# Patient Record
Sex: FEMALE | Race: WHITE | NOT HISPANIC OR LATINO | Employment: OTHER | URBAN - METROPOLITAN AREA
[De-identification: names, ages, dates, MRNs, and addresses within clinical notes are randomized per-mention and may not be internally consistent; named-entity substitution may affect disease eponyms.]

---

## 2023-03-20 LAB
ALANINE AMINOTRANSFERASE (SGPT) (U/L) IN SER/PLAS: 12 U/L (ref 7–45)
ALBUMIN (G/DL) IN SER/PLAS: 3.9 G/DL (ref 3.4–5)
ALKALINE PHOSPHATASE (U/L) IN SER/PLAS: 67 U/L (ref 33–136)
ANION GAP IN SER/PLAS: 13 MMOL/L (ref 10–20)
ASPARTATE AMINOTRANSFERASE (SGOT) (U/L) IN SER/PLAS: 23 U/L (ref 9–39)
BASOPHILS (10*3/UL) IN BLOOD BY AUTOMATED COUNT: 0.03 X10E9/L (ref 0–0.1)
BASOPHILS/100 LEUKOCYTES IN BLOOD BY AUTOMATED COUNT: 0.6 % (ref 0–2)
BILIRUBIN TOTAL (MG/DL) IN SER/PLAS: 1.1 MG/DL (ref 0–1.2)
CALCIUM (MG/DL) IN SER/PLAS: 10.2 MG/DL (ref 8.6–10.3)
CARBON DIOXIDE, TOTAL (MMOL/L) IN SER/PLAS: 27 MMOL/L (ref 21–32)
CHLORIDE (MMOL/L) IN SER/PLAS: 106 MMOL/L (ref 98–107)
CHOLESTEROL (MG/DL) IN SER/PLAS: 86 MG/DL (ref 0–199)
CHOLESTEROL IN HDL (MG/DL) IN SER/PLAS: 38.1 MG/DL
CHOLESTEROL/HDL RATIO: 2.3
CREATININE (MG/DL) IN SER/PLAS: 1.22 MG/DL (ref 0.5–1.05)
EOSINOPHILS (10*3/UL) IN BLOOD BY AUTOMATED COUNT: 0.19 X10E9/L (ref 0–0.4)
EOSINOPHILS/100 LEUKOCYTES IN BLOOD BY AUTOMATED COUNT: 3.9 % (ref 0–6)
ERYTHROCYTE DISTRIBUTION WIDTH (RATIO) BY AUTOMATED COUNT: 18.2 % (ref 11.5–14.5)
ERYTHROCYTE MEAN CORPUSCULAR HEMOGLOBIN CONCENTRATION (G/DL) BY AUTOMATED: 27.4 G/DL (ref 32–36)
ERYTHROCYTE MEAN CORPUSCULAR VOLUME (FL) BY AUTOMATED COUNT: 87 FL (ref 80–100)
ERYTHROCYTES (10*6/UL) IN BLOOD BY AUTOMATED COUNT: 3.79 X10E12/L (ref 4–5.2)
GFR FEMALE: 42 ML/MIN/1.73M2
GLUCOSE (MG/DL) IN SER/PLAS: 97 MG/DL (ref 74–99)
HEMATOCRIT (%) IN BLOOD BY AUTOMATED COUNT: 32.9 % (ref 36–46)
HEMOGLOBIN (G/DL) IN BLOOD: 9 G/DL (ref 12–16)
IMMATURE GRANULOCYTES/100 LEUKOCYTES IN BLOOD BY AUTOMATED COUNT: 0.2 % (ref 0–0.9)
LDL: 32 MG/DL (ref 0–99)
LEUKOCYTES (10*3/UL) IN BLOOD BY AUTOMATED COUNT: 4.9 X10E9/L (ref 4.4–11.3)
LYMPHOCYTES (10*3/UL) IN BLOOD BY AUTOMATED COUNT: 1.28 X10E9/L (ref 0.8–3)
LYMPHOCYTES/100 LEUKOCYTES IN BLOOD BY AUTOMATED COUNT: 26 % (ref 13–44)
MONOCYTES (10*3/UL) IN BLOOD BY AUTOMATED COUNT: 0.6 X10E9/L (ref 0.05–0.8)
MONOCYTES/100 LEUKOCYTES IN BLOOD BY AUTOMATED COUNT: 12.2 % (ref 2–10)
NEUTROPHILS (10*3/UL) IN BLOOD BY AUTOMATED COUNT: 2.82 X10E9/L (ref 1.6–5.5)
NEUTROPHILS/100 LEUKOCYTES IN BLOOD BY AUTOMATED COUNT: 57.1 % (ref 40–80)
NRBC (PER 100 WBCS) BY AUTOMATED COUNT: 0 /100 WBC (ref 0–0)
PLATELETS (10*3/UL) IN BLOOD AUTOMATED COUNT: 172 X10E9/L (ref 150–450)
POTASSIUM (MMOL/L) IN SER/PLAS: 4.4 MMOL/L (ref 3.5–5.3)
PROTEIN TOTAL: 6.4 G/DL (ref 6.4–8.2)
SODIUM (MMOL/L) IN SER/PLAS: 142 MMOL/L (ref 136–145)
THYROTROPIN (MIU/L) IN SER/PLAS BY DETECTION LIMIT <= 0.05 MIU/L: 2.44 MIU/L (ref 0.44–3.98)
TRIGLYCERIDE (MG/DL) IN SER/PLAS: 78 MG/DL (ref 0–149)
UREA NITROGEN (MG/DL) IN SER/PLAS: 33 MG/DL (ref 6–23)
VLDL: 16 MG/DL (ref 0–40)

## 2023-10-04 ENCOUNTER — TREATMENT (OUTPATIENT)
Dept: PHYSICAL THERAPY | Facility: CLINIC | Age: 88
End: 2023-10-04
Payer: MEDICARE

## 2023-10-04 DIAGNOSIS — H81.12 BENIGN PAROXYSMAL POSITIONAL VERTIGO OF LEFT EAR: ICD-10-CM

## 2023-10-04 DIAGNOSIS — R42 DIZZINESS: Primary | ICD-10-CM

## 2023-10-04 PROCEDURE — 95992 CANALITH REPOSITIONING PROC: CPT | Mod: GP

## 2023-10-04 PROCEDURE — 97530 THERAPEUTIC ACTIVITIES: CPT | Mod: GP,59

## 2023-10-04 ASSESSMENT — PAIN SCALES - GENERAL: PAINLEVEL_OUTOF10: 10 - WORST POSSIBLE PAIN

## 2023-10-04 ASSESSMENT — PAIN - FUNCTIONAL ASSESSMENT: PAIN_FUNCTIONAL_ASSESSMENT: 0-10

## 2023-10-04 NOTE — PROGRESS NOTES
Physical Therapy    Physical Therapy Treatment    Patient Name: Keli Aparicio  MRN: [id number]  Today's Date: 10/4/2023  Time Calculation  Start Time: 1553  Stop Time: 1628  Time Calculation (min): 35 min    Insurance   Visit number: 8   Approved number of visits:  19 total  [facility]   Medicare Certification Period: Beginnin/ 10/ 2023 Ending: 10/ 08/ 2023   Medicare Re-Certification Period: Beginnin2023 Endin2023     Assessment:   Positive [place] Hallpike to the left with torsional nystagmus of short duration (<20 seconds) consistent with L posterior canalithiasis. Added vibration today to address non-resolving BPPV and will monitor response.  She was still challenged with static balance so did not update HEP.  Pt left with all questions answered, no increase in symptoms.    Plan:   Progress balance as tolerated.  Monitor response to BPPV.    Current Problem  1. Dizziness        2. Benign paroxysmal positional vertigo of left ear            Subjective   General   Pt denies any falls since last session. Reports compliance with balance HEP.  Son present during session.   Precautions  Precautions  Precautions Comment: HTN, leaky heart valve, Afib  osteopenic  Denies: CA, pacemaker, DM, HTN, or other known cardio/neuro/pulmonary problems.Fall risk: high       Pain  Pain Assessment: 0-10  Pain Score: 10 - Worst possible pain  Pain Location:  (left shoulder)  Pain Interventions:  (Pt agreeable to participate despite high pain levels.)    Objective     Treatments:  Semont for L posterior canalithiasis 5x - includes time between CRP  -with increased/decreased hold time 1x each   -2x with vibration x 30 sec prior to start  -1x with increased cervical AROM using body position as able    NBOS on land with EO x 30 sec - good challenge still    OP EDUCATION:    -Answered KELI's questions in full.  -reviewed relevant anatomy of BPPV using model  -Time spent on positional testing (multiple trials) and assessing  symptoms.  -KELI to avoid looking up/down during ADLs for a few hours after repositioning.  -KELI advised that she may feel off balance after the reposition and to use safety measures at home/device with walking or resting as needed to decrease fall risk, KELI verbalized understanding.     -KELI Educated on some strategies for management of non-resolving BPPV including:   -->use of vibration  -->option of holding each position longer vs going through the positions quicker in attempts to keep the otoconia flowing  -->reviewed recommendation of strict adherence with post repositioning guidelines and avoiding excessive head motions  -->vitamin D and rationale

## 2023-10-11 ENCOUNTER — TREATMENT (OUTPATIENT)
Dept: PHYSICAL THERAPY | Facility: CLINIC | Age: 88
End: 2023-10-11
Payer: MEDICARE

## 2023-10-11 DIAGNOSIS — H81.12 BENIGN PAROXYSMAL POSITIONAL VERTIGO OF LEFT EAR: Primary | ICD-10-CM

## 2023-10-11 DIAGNOSIS — R42 DIZZINESS: ICD-10-CM

## 2023-10-11 PROBLEM — M79.89 LEG SWELLING: Status: ACTIVE | Noted: 2017-06-01

## 2023-10-11 PROBLEM — I34.0 MITRAL REGURGITATION: Status: ACTIVE | Noted: 2023-10-11

## 2023-10-11 PROBLEM — I83.12 VARICOSE VEINS OF BOTH LOWER EXTREMITIES WITH INFLAMMATION: Status: ACTIVE | Noted: 2018-05-16

## 2023-10-11 PROBLEM — I83.11 VARICOSE VEINS OF BOTH LOWER EXTREMITIES WITH INFLAMMATION: Status: ACTIVE | Noted: 2018-05-16

## 2023-10-11 PROBLEM — M54.2 NECK PAIN: Status: ACTIVE | Noted: 2023-10-11

## 2023-10-11 PROBLEM — I48.21 PERMANENT ATRIAL FIBRILLATION (MULTI): Status: ACTIVE | Noted: 2023-10-11

## 2023-10-11 PROBLEM — E03.9 HYPOTHYROID: Status: ACTIVE | Noted: 2019-07-03

## 2023-10-11 PROBLEM — I10 ESSENTIAL HYPERTENSION: Status: ACTIVE | Noted: 2017-06-01

## 2023-10-11 PROBLEM — R35.0 FREQUENCY OF URINATION: Status: ACTIVE | Noted: 2023-10-11

## 2023-10-11 PROBLEM — R79.1 SUPRATHERAPEUTIC INR: Status: ACTIVE | Noted: 2019-07-03

## 2023-10-11 PROBLEM — M20.41 HAMMER TOE OF RIGHT FOOT: Status: ACTIVE | Noted: 2017-05-14

## 2023-10-11 PROBLEM — R51.9 HEADACHE: Status: ACTIVE | Noted: 2023-10-11

## 2023-10-11 PROBLEM — N95.2 ATROPHY OF VAGINA: Status: ACTIVE | Noted: 2023-10-11

## 2023-10-11 PROBLEM — E78.00 PURE HYPERCHOLESTEROLEMIA: Status: ACTIVE | Noted: 2017-06-01

## 2023-10-11 PROCEDURE — 97112 NEUROMUSCULAR REEDUCATION: CPT | Mod: GP,59

## 2023-10-11 PROCEDURE — 95992 CANALITH REPOSITIONING PROC: CPT | Mod: GP

## 2023-10-11 PROCEDURE — 97530 THERAPEUTIC ACTIVITIES: CPT | Mod: GP,59

## 2023-10-11 RX ORDER — PANTOPRAZOLE SODIUM 40 MG/1
FOR SUSPENSION ORAL
COMMUNITY

## 2023-10-11 RX ORDER — TRIAMCINOLONE ACETONIDE 1 MG/G
OINTMENT TOPICAL NIGHTLY
COMMUNITY
Start: 2022-02-10

## 2023-10-11 RX ORDER — ATORVASTATIN CALCIUM 10 MG/1
10 TABLET, FILM COATED ORAL
COMMUNITY
Start: 2012-08-30

## 2023-10-11 RX ORDER — NITROGLYCERIN 0.4 MG/1
0.4 TABLET SUBLINGUAL EVERY 5 MIN PRN
COMMUNITY
Start: 2021-06-11

## 2023-10-11 RX ORDER — OXYBUTYNIN CHLORIDE 5 MG/1
5 TABLET ORAL 3 TIMES DAILY
COMMUNITY
Start: 2017-04-12

## 2023-10-11 RX ORDER — DIPHENOXYLATE HYDROCHLORIDE AND ATROPINE SULFATE 2.5; .025 MG/1; MG/1
1 TABLET ORAL 3 TIMES DAILY
COMMUNITY
Start: 2023-08-21

## 2023-10-11 RX ORDER — ESTRADIOL 0.1 MG/G
CREAM VAGINAL
COMMUNITY
Start: 2023-08-24

## 2023-10-11 RX ORDER — CARBAMAZEPINE 100 MG/1
100 TABLET, EXTENDED RELEASE ORAL 2 TIMES DAILY
COMMUNITY
Start: 2023-02-27

## 2023-10-11 RX ORDER — FUROSEMIDE 20 MG/1
1 TABLET ORAL
COMMUNITY
Start: 2022-10-17

## 2023-10-11 RX ORDER — SOLIFENACIN SUCCINATE 5 MG/1
5 TABLET, FILM COATED ORAL
COMMUNITY
Start: 2022-08-04

## 2023-10-11 RX ORDER — WARFARIN SODIUM 5 MG/1
2.5 TABLET ORAL DAILY
COMMUNITY
Start: 2023-10-02

## 2023-10-11 RX ORDER — METOPROLOL TARTRATE 25 MG/1
25 TABLET, FILM COATED ORAL 2 TIMES DAILY
COMMUNITY
Start: 2023-01-30

## 2023-10-11 RX ORDER — HYDROCORTISONE 25 MG/G
CREAM TOPICAL 2 TIMES DAILY
COMMUNITY
Start: 2021-03-25

## 2023-10-11 RX ORDER — DIPHENOXYLATE HYDROCHLORIDE AND ATROPINE SULFATE 2.5; .025 MG/1; MG/1
1 TABLET ORAL 4 TIMES DAILY PRN
COMMUNITY

## 2023-10-11 RX ORDER — LEVOTHYROXINE SODIUM 125 UG/1
125 TABLET ORAL
COMMUNITY
Start: 2017-04-24

## 2023-10-11 RX ORDER — ISOSORBIDE MONONITRATE 30 MG/1
30 TABLET, EXTENDED RELEASE ORAL
COMMUNITY
Start: 2023-09-14

## 2023-10-11 RX ORDER — LOSARTAN POTASSIUM AND HYDROCHLOROTHIAZIDE 12.5; 5 MG/1; MG/1
1 TABLET ORAL
COMMUNITY
Start: 2014-06-26

## 2023-10-11 RX ORDER — MECLIZINE HCL 12.5 MG 12.5 MG/1
12.5 TABLET ORAL EVERY 8 HOURS PRN
COMMUNITY

## 2023-10-11 RX ORDER — WARFARIN SODIUM 5 MG/1
5 TABLET ORAL
COMMUNITY

## 2023-10-11 ASSESSMENT — PAIN - FUNCTIONAL ASSESSMENT: PAIN_FUNCTIONAL_ASSESSMENT: 0-10

## 2023-10-11 ASSESSMENT — PAIN SCALES - GENERAL: PAINLEVEL_OUTOF10: 10 - WORST POSSIBLE PAIN

## 2023-10-11 NOTE — PROGRESS NOTES
Physical Therapy    Physical Therapy Treatment    Patient Name: Kelsey Aparicio  MRN: [id number]  Today's Date: 10/12/2023  Time Calculation  Start Time: 1547  Stop Time: 1631  Time Calculation (min): 44 min  Insurance reviewed   Visit number: 9   Approved number of visits:  20   [facility]   Medicare Certification Period: Beginnin/ 10/ 2023 Ending: 10/ 08/ 2023   Medicare Re-Certification Period: Beginnin2023 Endin2023     Assessment:   Positive [place] Hallpike to the Left with torsional nystagmus of short duration (<30 seconds) consistent with left posterior canalithiasis.  Performed modified Semont and will monitor effectiveness.  Decreased duration of nystagmus with repeated CRP, but still present on last CRP.  Held vibration due to subjective comments and per pt request.  If not response next session, pt may benefit from additional follow up from referring provider due to non-resolving BPPV despite multiple trials/modifications to address non-resolving BPPV.     Plan:   Reassess next session.      Current Problem  1. Benign paroxysmal positional vertigo of left ear        2. Dizziness            Subjective   General   Pt reports no change in dizziness since last session but it has lessened overall since onset of PT. Denies any falls since last session.  Did report HA after last session for the remainder of the day which she feels was due to vibration.  Son present during session.      Precautions  Precautions  Precautions Comment: HTN, leaky heart valve, Afib osteopenic Denies: CA, pacemaker, DM, HTN, or other known cardio/neuro/pulmonary problems.Fall risk: high       Pain  Pain Assessment: 0-10  Pain Score: 10 - Worst possible pain  Pain Location:  (Left shoulder, pt agreeable to participate despite high pain levels.)    Objective     Positional testing:   Left side lying test: positive with L torsional nystagmus    Treatments:    Access Code: [id number]    - Narrow Stance with Counter Support - 2 x  daily - 7 x weekly - 3 sets - 30 sec hold - removed from HEP as min to no challenge  - Standing Romberg to 1/4 Tandem Stance - 2 x daily - 7 x weekly - 3 sets - 30 sec hold - more challenged with R LE posterior  - Feet Together Balance at Counter Top Eyes Closed  - 2 x daily - 7 x weekly - 3 sets - 15-30 sec hold  - Walking with Counter Support  - 1-2 x daily - 7 x weekly - 10 reps  - Backward Walking with Counter Support  - 1-2 x daily - 7 x weekly - 10 reps    Activity:  Modified Semont for L posterior canalithiasis 3x. Includes time between CRP    OP EDUCATION:   Reviewed relevant anatomy using model   -Reviewed pathophysiology of BPPV using model, rationale for CRP.  -Time spent on positional testing and assessing symptoms.  -Kelsey  to avoid looking up/down during ADLs for a few hours after repositioning.  -Kelsey advised that she may feel off balance after the reposition and to use safety measures at home/device with walking or resting as needed to decrease fall risk, Kelsey verbalized understanding.  -balance system and component and rationale for updated balance  -pt advised of PT recommendation to use WW due to requires HHA in clinic but not receptive to use int he community, prefers HHA by son  -reviewed how to progress balance ex to remain appropriately challenged at home

## 2023-10-17 ENCOUNTER — APPOINTMENT (OUTPATIENT)
Dept: UROLOGY | Facility: CLINIC | Age: 88
End: 2023-10-17
Payer: MEDICARE

## 2023-10-18 ENCOUNTER — APPOINTMENT (OUTPATIENT)
Dept: PHYSICAL THERAPY | Facility: CLINIC | Age: 88
End: 2023-10-18
Payer: MEDICARE

## 2023-10-25 ENCOUNTER — APPOINTMENT (OUTPATIENT)
Dept: PHARMACY | Facility: CLINIC | Age: 88
End: 2023-10-25
Payer: MEDICARE

## 2023-10-30 ENCOUNTER — ANTICOAGULATION - WARFARIN VISIT (OUTPATIENT)
Dept: PHARMACY | Facility: CLINIC | Age: 88
End: 2023-10-30
Payer: MEDICARE

## 2023-10-30 DIAGNOSIS — I48.20 CHRONIC ATRIAL FIBRILLATION (MULTI): Primary | ICD-10-CM

## 2023-10-30 LAB
POC INR: 2.9
POC PROTHROMBIN TIME: NORMAL

## 2023-10-30 PROCEDURE — 99212 OFFICE O/P EST SF 10 MIN: CPT | Performed by: PHARMACIST

## 2023-10-30 PROCEDURE — 85610 PROTHROMBIN TIME: CPT | Mod: QW | Performed by: PHARMACIST

## 2023-10-30 NOTE — PROGRESS NOTES
Kelsey Aparicio is a 89 y.o. female with history of atrial fibrillation who presents today for anticoagulation monitoring and adjustment.  INR 2.9 is therapeutic for this patient (goal range 2-3) and is reflective of 17.5 mg TWD  Patient verifies current dosing regimen, patient able to verbally recall dose  Patient reports 0 missed doses since last INR  Last INR 2.5 on 9/26/23 (5 week interval)  Patient denies s/sx clotting and/or stroke  Patient denies hematuria, epistaxis, rectal bleeding  Patient denies changes in diet, alcohol, or tobacco use  Vegetable intake consistent from week to week  Reviewed medication list and drug allergies with patient, updated any medication additions or modifications accordingly  Acetaminophen intake: no changes   Patient also denies any pending medical or dental procedures scheduled at this time  Patient was instructed to continue with warfarin 17.5mg TWD and RTC 5 weeks    Chani Gibbs, TemiD, BCPS

## 2023-12-04 ENCOUNTER — ANTICOAGULATION - WARFARIN VISIT (OUTPATIENT)
Dept: PHARMACY | Facility: CLINIC | Age: 88
End: 2023-12-04
Payer: MEDICARE

## 2023-12-04 DIAGNOSIS — I48.20 CHRONIC ATRIAL FIBRILLATION (MULTI): Primary | ICD-10-CM

## 2023-12-04 LAB
POC INR: 2.1
POC PROTHROMBIN TIME: NORMAL

## 2023-12-04 PROCEDURE — 85610 PROTHROMBIN TIME: CPT | Performed by: PHARMACIST

## 2023-12-04 PROCEDURE — 99212 OFFICE O/P EST SF 10 MIN: CPT | Performed by: PHARMACIST

## 2023-12-04 NOTE — PROGRESS NOTES
Verified current dose with pt son.  Pt had back and abdominal pain and went to ED Sat and stayed in hospital overnight.  She was constipated.    No new meds or med changes since last visit.  Pt denies unusual bleed/bruise.  No upcoming procedures.  Inr = 2.1  Continue same dose and check again in 5 weeks.

## 2023-12-15 ENCOUNTER — HOSPITAL ENCOUNTER (OUTPATIENT)
Dept: RADIOLOGY | Facility: HOSPITAL | Age: 88
Discharge: HOME | End: 2023-12-15
Payer: MEDICARE

## 2023-12-15 DIAGNOSIS — M54.6 PAIN IN THORACIC SPINE: ICD-10-CM

## 2023-12-15 PROCEDURE — 72070 X-RAY EXAM THORAC SPINE 2VWS: CPT

## 2023-12-15 PROCEDURE — 72070 X-RAY EXAM THORAC SPINE 2VWS: CPT | Performed by: RADIOLOGY

## 2024-01-10 ENCOUNTER — ANTICOAGULATION - WARFARIN VISIT (OUTPATIENT)
Dept: PHARMACY | Facility: CLINIC | Age: 89
End: 2024-01-10
Payer: MEDICARE

## 2024-01-10 DIAGNOSIS — I48.20 CHRONIC ATRIAL FIBRILLATION (MULTI): Primary | ICD-10-CM

## 2024-01-10 NOTE — Clinical Note
Patient Dc'd from [facility], follows with [facility]ag

## 2024-01-10 NOTE — PROGRESS NOTES
Patient now follows with [facility]   Called and verified that patient is active with them and attending appointments.   At this time, patient will be discharged from [facility]     Dr. Brambila updated     Chani Gibbs, PharmD, BCPS   1/10/2024 9:04 AM

## 2024-12-23 ENCOUNTER — NURSING HOME VISIT (OUTPATIENT)
Dept: POST ACUTE CARE | Facility: EXTERNAL LOCATION | Age: 89
End: 2024-12-23
Payer: MEDICARE

## 2024-12-23 DIAGNOSIS — R26.9 ABNORMALITY OF GAIT: ICD-10-CM

## 2024-12-23 DIAGNOSIS — I10 ESSENTIAL HYPERTENSION: ICD-10-CM

## 2024-12-23 DIAGNOSIS — E03.9 HYPOTHYROIDISM, UNSPECIFIED TYPE: ICD-10-CM

## 2024-12-23 DIAGNOSIS — I48.20 CHRONIC ATRIAL FIBRILLATION (MULTI): Primary | ICD-10-CM

## 2024-12-23 NOTE — PROGRESS NOTES
HISTORY & PHYSICAL    Subjective   Chief complaint: Kelsey Aparicio is a 89 y.o. female who is a long term resident patient being seen and evaluated for multiple medical problems.  Patient presents for weakness.    HPI:  Kelsey Aparicio was admitted for acute hypoxemic respiratory failure secondary to acute on chronic diastolic heart failure. You were admitted for difficulty breathing and new oxygen requirement secondary to fluid overload from heart failure exacerbation. You had bilateral pleural effusions on imaging (left > right). Patient seen by both the cardiology and pulmonary teams. Pulmonary recommended against thoracentesis (needle drainage) and recommended continuing diuresis alone with medications. You were initially given IV Lasix and ultimately transitioned to oral torsemide 20 mg 4 times a week prior to discharge as recommended by the cardiologist. Oxygenating well on 2 L O2 (with saturations 96-98%). Patient discharged to [facility]         Past Medical History:   Diagnosis Date    Personal history of other diseases of the circulatory system 06/12/2015    History of cardiac disorder    Personal history of other diseases of the circulatory system 06/12/2015    History of hypertension    Personal history of other diseases of the digestive system     History of gastroesophageal reflux (GERD)    Personal history of other diseases of the musculoskeletal system and connective tissue 06/12/2015    History of arthritis    Personal history of other endocrine, nutritional and metabolic disease     History of thyroid disorder       Past Surgical History:   Procedure Laterality Date    OTHER SURGICAL HISTORY  11/07/2022    Wrist surgery       Family History   Problem Relation Name Age of Onset    No Known Problems Mother      No Known Problems Father      Other (cardiac disorder) Other      Diabetes Other      Hypertension Other         Social History     Socioeconomic History    Marital status:       Social Drivers of Health     Financial Resource Strain: Medium Risk (2/13/2024)    [facility]    Overall Financial Resource Strain (CARDIA)     Difficulty of Paying Living Expenses: Somewhat hard   Food Insecurity: No Food Insecurity (12/18/2024)    Received from [facility]    Hunger Vital Sign     Worried About Running Out of Food in the Last Year: Never true     Ran Out of Food in the Last Year: Never true   Transportation Needs: No Transportation Needs (12/18/2024)    Received from [facility]    PRAPARE - Transportation     Lack of Transportation (Medical): No     Lack of Transportation (Non-Medical): No   Housing Stability: Low Risk  (12/18/2024)    Received from [facility]    Housing Stability Vital Sign     Unable to Pay for Housing in the Last Year: No     Number of Times Moved in the Last Year: 1     Homeless in the Last Year: No       Vital signs: Stable    Objective   Physical Exam  HENT:      Head: Normocephalic and atraumatic.      Nose: Nose normal.      Mouth/Throat:      Mouth: Mucous membranes are moist.      Pharynx: Oropharynx is clear.   Eyes:      Extraocular Movements: Extraocular movements intact.      Pupils: Pupils are equal, round, and reactive to light.   Cardiovascular:      Rate and Rhythm: Normal rate and regular rhythm.   Pulmonary:      Effort: No respiratory distress.      Breath sounds: Normal breath sounds. No wheezing, rhonchi or rales.   Abdominal:      General: Bowel sounds are normal. There is no distension.      Palpations: Abdomen is soft.      Tenderness: There is no abdominal tenderness. There is no guarding.   Musculoskeletal:      Right lower leg: No edema.      Left lower leg: No edema.   Skin:     General: Skin is warm and dry.   Neurological:      Mental Status: She is alert. Mental status is at baseline.         Assessment/Plan   Problem List Items Addressed This Visit       Essential hypertension     Continue  current medications         Abnormality of gait     PT OT         Hypothyroid     Continue current medications         Chronic atrial fibrillation (Multi) - Primary     Continue current medications          Hospital records reviewed  Medications, treatments, and labs reviewed  Continue medications and treatments as listed in EMR  Discussed with nursing and therapy    Marvin Bernard DO    Scribe Attestation  Lou PLATAibrachel   attest that this documentation has been prepared under the direction and in the presence of Marvin Bernard DO    Provider Attestation - Scribe documentation  All medical record entries made by the Scribe were at my direction and personally dictated by me. I have reviewed the chart and agree that the record accurately reflects my personal performance of the history, physical exam, discussion and plan.

## 2024-12-23 NOTE — LETTER
Patient: Kelsey Aparicio  : 3/12/1934    Encounter Date: 2024    HISTORY & PHYSICAL    Subjective  Chief complaint: Kelsey Aparicio is a 89 y.o. female who is a long term resident patient being seen and evaluated for multiple medical problems.  Patient presents for weakness.    HPI:  Kelsey Aparicio was admitted for acute hypoxemic respiratory failure secondary to acute on chronic diastolic heart failure. You were admitted for difficulty breathing and new oxygen requirement secondary to fluid overload from heart failure exacerbation. You had bilateral pleural effusions on imaging (left > right). Patient seen by both the cardiology and pulmonary teams. Pulmonary recommended against thoracentesis (needle drainage) and recommended continuing diuresis alone with medications. You were initially given IV Lasix and ultimately transitioned to oral torsemide 20 mg 4 times a week prior to discharge as recommended by the cardiologist. Oxygenating well on 2 L O2 (with saturations 96-98%). Patient discharged to [facility]         Past Medical History:   Diagnosis Date   • Personal history of other diseases of the circulatory system 2015    History of cardiac disorder   • Personal history of other diseases of the circulatory system 2015    History of hypertension   • Personal history of other diseases of the digestive system     History of gastroesophageal reflux (GERD)   • Personal history of other diseases of the musculoskeletal system and connective tissue 2015    History of arthritis   • Personal history of other endocrine, nutritional and metabolic disease     History of thyroid disorder       Past Surgical History:   Procedure Laterality Date   • OTHER SURGICAL HISTORY  2022    Wrist surgery       Family History   Problem Relation Name Age of Onset   • No Known Problems Mother     • No Known Problems Father     • Other (cardiac disorder) Other     • Diabetes Other     • Hypertension  Other         Social History     Socioeconomic History   • Marital status:      Social Drivers of Health     Financial Resource Strain: Medium Risk (2/13/2024)    [facility]    Overall Financial Resource Strain (CARDIA)    • Difficulty of Paying Living Expenses: Somewhat hard   Food Insecurity: No Food Insecurity (12/18/2024)    Received from [facility]    Hunger Vital Sign    • Worried About Running Out of Food in the Last Year: Never true    • Ran Out of Food in the Last Year: Never true   Transportation Needs: No Transportation Needs (12/18/2024)    Received from [facility]    PRAPARE - Transportation    • Lack of Transportation (Medical): No    • Lack of Transportation (Non-Medical): No   Housing Stability: Low Risk  (12/18/2024)    Received from [facility]    Housing Stability Vital Sign    • Unable to Pay for Housing in the Last Year: No    • Number of Times Moved in the Last Year: 1    • Homeless in the Last Year: No       Vital signs: Stable    Objective  Physical Exam  HENT:      Head: Normocephalic and atraumatic.      Nose: Nose normal.      Mouth/Throat:      Mouth: Mucous membranes are moist.      Pharynx: Oropharynx is clear.   Eyes:      Extraocular Movements: Extraocular movements intact.      Pupils: Pupils are equal, round, and reactive to light.   Cardiovascular:      Rate and Rhythm: Normal rate and regular rhythm.   Pulmonary:      Effort: No respiratory distress.      Breath sounds: Normal breath sounds. No wheezing, rhonchi or rales.   Abdominal:      General: Bowel sounds are normal. There is no distension.      Palpations: Abdomen is soft.      Tenderness: There is no abdominal tenderness. There is no guarding.   Musculoskeletal:      Right lower leg: No edema.      Left lower leg: No edema.   Skin:     General: Skin is warm and dry.   Neurological:      Mental Status: She is alert. Mental status is at baseline.          Assessment/Plan  Problem List Items Addressed This Visit       Essential hypertension     Continue current medications         Abnormality of gait     PT OT         Hypothyroid     Continue current medications         Chronic atrial fibrillation (Multi) - Primary     Continue current medications          Hospital records reviewed  Medications, treatments, and labs reviewed  Continue medications and treatments as listed in EMR  Discussed with nursing and therapy    Marvin Bernard DO    Scribe Attestation  Lou PLATAibrachel   attest that this documentation has been prepared under the direction and in the presence of Marvin Bernard DO    Provider Attestation - Scribe documentation  All medical record entries made by the Scribe were at my direction and personally dictated by me. I have reviewed the chart and agree that the record accurately reflects my personal performance of the history, physical exam, discussion and plan.      Electronically Signed By: Marvin Bernard DO   12/27/24  2:53 PM

## 2024-12-27 ENCOUNTER — NURSING HOME VISIT (OUTPATIENT)
Dept: POST ACUTE CARE | Facility: EXTERNAL LOCATION | Age: 89
End: 2024-12-27
Payer: MEDICARE

## 2024-12-27 DIAGNOSIS — I10 ESSENTIAL HYPERTENSION: ICD-10-CM

## 2024-12-27 DIAGNOSIS — I48.20 CHRONIC ATRIAL FIBRILLATION (MULTI): Primary | ICD-10-CM

## 2024-12-27 DIAGNOSIS — R53.1 WEAKNESS: ICD-10-CM

## 2024-12-27 PROCEDURE — 99308 SBSQ NF CARE LOW MDM 20: CPT | Performed by: REGISTERED NURSE

## 2024-12-27 NOTE — LETTER
Patient: Kelsey Aparicio  : 3/12/1934    Encounter Date: 2024    PROGRESS NOTE    Subjective  Chief complaint: Kelsey Aparicio is a 89 y.o. female who is an acute skilled patient being seen and evaluated for weakness    HPI:  Therapy has been working with the patient to improve strength and endurance with ADLs, transfers, and mobility.  Patient continues to work toward goals.  Patient is stable and has no new complaints.  Nursing staff voices no new concerns today.    Objective  Vital signs: 138/74, 97.6, 87, 17, 94%    Physical Exam  Constitutional:       General: She is not in acute distress.  Eyes:      Extraocular Movements: Extraocular movements intact.   Pulmonary:      Effort: Pulmonary effort is normal.   Musculoskeletal:      Cervical back: Neck supple.   Neurological:      Mental Status: She is alert.   Psychiatric:         Mood and Affect: Mood normal.         Behavior: Behavior is cooperative.         Assessment/Plan  Problem List Items Addressed This Visit       Essential hypertension     Continue current medications          Chronic atrial fibrillation (Multi) - Primary     Continue current medications         Weakness     Therapy           Medications, treatments, and labs reviewed  Continue medications and treatments as listed in [facility]    Scribe Attestation  IWendy Scribe   attest that this documentation has been prepared under the direction and in the presence of CLARKE Lion.    Provider Attestation - Scribe documentation  All medical record entries made by the Scribe were at my direction and personally dictated by me. I have reviewed the chart and agree that the record accurately reflects my personal performance of the history, physical exam, discussion and plan.    CLARKE Lion        Electronically Signed By: CLARKE Lion   24  9:19 PM

## 2024-12-30 PROBLEM — R53.1 WEAKNESS: Status: ACTIVE | Noted: 2024-12-30

## 2024-12-30 NOTE — PROGRESS NOTES
PROGRESS NOTE    Subjective   Chief complaint: Kelsey Aparicio is a 89 y.o. female who is an acute skilled patient being seen and evaluated for weakness    HPI:  Therapy has been working with the patient to improve strength and endurance with ADLs, transfers, and mobility.  Patient continues to work toward goals.  Patient is stable and has no new complaints.  Nursing staff voices no new concerns today.    Objective   Vital signs: 138/74, 97.6, 87, 17, 94%    Physical Exam  Constitutional:       General: She is not in acute distress.  Eyes:      Extraocular Movements: Extraocular movements intact.   Pulmonary:      Effort: Pulmonary effort is normal.   Musculoskeletal:      Cervical back: Neck supple.   Neurological:      Mental Status: She is alert.   Psychiatric:         Mood and Affect: Mood normal.         Behavior: Behavior is cooperative.         Assessment/Plan   Problem List Items Addressed This Visit       Essential hypertension     Continue current medications          Chronic atrial fibrillation (Multi) - Primary     Continue current medications         Weakness     Therapy           Medications, treatments, and labs reviewed  Continue medications and treatments as listed in [facility]    Scribe Attestation  Wendy PLATA Scribe   attest that this documentation has been prepared under the direction and in the presence of CLARKE Lion.    Provider Attestation - Scribe documentation  All medical record entries made by the Scribe were at my direction and personally dictated by me. I have reviewed the chart and agree that the record accurately reflects my personal performance of the history, physical exam, discussion and plan.    CLARKE Lion